# Patient Record
(demographics unavailable — no encounter records)

---

## 2024-10-17 NOTE — PHYSICAL EXAM
[Alert] : alert [Well Nourished] : well nourished [Healthy Appearance] : healthy appearance [No Acute Distress] : no acute distress [Well Developed] : well developed [Normal Pupil & Iris Size/Symmetry] : normal pupil and iris size and symmetry [No Discharge] : no discharge [No Photophobia] : no photophobia [Sclera Not Icteric] : sclera not icteric [Normal TMs] : both tympanic membranes were normal [Normal Nasal Mucosa] : the nasal mucosa was normal [Normal Lips/Tongue] : the lips and tongue were normal [Normal Outer Ear/Nose] : the ears and nose were normal in appearance [Normal Tonsils] : normal tonsils [No Thrush] : no thrush [Pale mucosa] : pale mucosa [Boggy Nasal Turbinates] : boggy and/or pale nasal turbinates [Posterior Pharyngeal Cobblestoning] : posterior pharyngeal cobblestoning [Supple] : the neck was supple [Normal Rate and Effort] : normal respiratory rhythm and effort [No Crackles] : no crackles [No Retractions] : no retractions [Bilateral Audible Breath Sounds] : bilateral audible breath sounds [Normal Rate] : heart rate was normal  [Normal S1, S2] : normal S1 and S2 [No murmur] : no murmur [Regular Rhythm] : with a regular rhythm [Soft] : abdomen soft [Not Tender] : non-tender [Not Distended] : not distended [No HSM] : no hepato-splenomegaly [Normal Cervical Lymph Nodes] : cervical [Skin Intact] : skin intact  [No Rash] : no rash [No Skin Lesions] : no skin lesions [No clubbing] : no clubbing [No Edema] : no edema [No Cyanosis] : no cyanosis [Normal Mood] : mood was normal [Normal Affect] : affect was normal [Alert, Awake, Oriented as Age-Appropriate] : alert, awake, oriented as age appropriate [Wheezing] : no wheezing was heard [de-identified] : patches of hypopigmentation on face

## 2024-10-17 NOTE — PHYSICAL EXAM
[Alert] : alert [Well Nourished] : well nourished [Healthy Appearance] : healthy appearance [No Acute Distress] : no acute distress [Well Developed] : well developed [Normal Pupil & Iris Size/Symmetry] : normal pupil and iris size and symmetry [No Discharge] : no discharge [No Photophobia] : no photophobia [Sclera Not Icteric] : sclera not icteric [Normal TMs] : both tympanic membranes were normal [Normal Nasal Mucosa] : the nasal mucosa was normal [Normal Lips/Tongue] : the lips and tongue were normal [Normal Outer Ear/Nose] : the ears and nose were normal in appearance [Normal Tonsils] : normal tonsils [No Thrush] : no thrush [Pale mucosa] : pale mucosa [Boggy Nasal Turbinates] : boggy and/or pale nasal turbinates [Posterior Pharyngeal Cobblestoning] : posterior pharyngeal cobblestoning [Supple] : the neck was supple [Normal Rate and Effort] : normal respiratory rhythm and effort [No Crackles] : no crackles [No Retractions] : no retractions [Bilateral Audible Breath Sounds] : bilateral audible breath sounds [Normal Rate] : heart rate was normal  [Normal S1, S2] : normal S1 and S2 [No murmur] : no murmur [Regular Rhythm] : with a regular rhythm [Soft] : abdomen soft [Not Tender] : non-tender [Not Distended] : not distended [No HSM] : no hepato-splenomegaly [Normal Cervical Lymph Nodes] : cervical [Skin Intact] : skin intact  [No Rash] : no rash [No Skin Lesions] : no skin lesions [No clubbing] : no clubbing [No Edema] : no edema [No Cyanosis] : no cyanosis [Normal Mood] : mood was normal [Normal Affect] : affect was normal [Alert, Awake, Oriented as Age-Appropriate] : alert, awake, oriented as age appropriate [Wheezing] : no wheezing was heard [de-identified] : patches of hypopigmentation on face

## 2024-10-17 NOTE — IMPRESSION
[Fractional of Exhaled Nitric Oxide ___] : Fractional of Exhaled Nitric Oxide [unfilled] [Normal] : normal [Spirometry] : Spirometry [Normal Spirometry] : spirometry normal

## 2024-10-17 NOTE — SOCIAL HISTORY
[House] : [unfilled] lives in a house  [None] : none [Smokers in Household] : there are no smokers in the home [de-identified] : wood floor

## 2024-10-17 NOTE — SOCIAL HISTORY
[House] : [unfilled] lives in a house  [None] : none [Smokers in Household] : there are no smokers in the home [de-identified] : wood floor

## 2024-12-03 NOTE — HISTORY OF PRESENT ILLNESS
[de-identified] : Kath is a 7 year old girl with new onset cough who presents for follow-up.  Takes fluticasone BID.  Uses a spacer. Never missed a dose.  No SOB. Walks outside in the cold. No albuterol use in the last month. No activity limitation.  Sleeping in her own bed.  Taking zyrtec daily. Got a flu shot.  Oct 2024: Pt has been taking Fluticasone 44mcg/puff, 2 puffs BID. No missed doses. No albuterol use.  Parents have been more diligent about cleaning and has not been running the A/C anymore.  Also taking daily zyrtec. Reintroduced corn and broccoli as well as eggs and milk/. Ate nutella without an issue. Still avoiding apple - all forms of apple give eye swelling.   Sept 2024: Pt presents today for further evaluation. Last night she started coughing and when mother auscultated (mother is an NP), she heard wheezing and gave albuterol. Wheezing resolved with albuterol. Mother showed immunocap testing (results shown below).  Sept 4th: 5 weeks prior she had hubba isabell bubble gum and lips were swollen.  Months ago she had a ring pop and had hives on her back.  3 weeks ago she had dinner and then went upstairs to go to bed. the middle of her upper lip began to swell. Mom gave benadryl and 15 minutes later she started coughing and her voice sounded raspy. Mom is an NP and auscultated and heard wheezing. Drove to  where she was given decadron, an albuterol neb, a dose of epipen IM. After the epi she felt considerably. For dinner she had corn, egg, broccoli, Malaysian bread (nan - wheat based). Then had a watermelon icy. Watermelon icy was fairly new.  8/28 - pt was in the basement watching move and came upstairs and said she could not breathe. Laying on the sofa -not an old couch.  Finished basement but ceiling is wood.  8/30 at chocolate with cornflakes - had coughing and wheezing 30 minutes.  Gave albuterol neb which helped. Otherwise eats chocolate with no issues.  Mom gave her a neb and she improved.   9/2 -Slept with mother. Dad did not dust off bedframe.  Was also under the A/C vent. Started coughing and wheezing, mom gave her a nebulizer which did not help.  Stil heard wheezing.  Called 911, went to the ED and pt was fine.   Went to an allergist and was positive to dust mite 20, 30, cats, birch 25.2 tree, apple 0.85, egg 0.61.  Broccoli, corn, tomato, watermelon  - zero. Normal complements (C3 171, C4 19). When she eats apples her eye gets swollen  June/July - one eye swelling with   Amoxicillin - had lip swelling and hives everywhere.  No nocturnal cough. No cough with activity.  Eczema from 1-3 years of age. Also used to get random hives when she was 1-3 years of age. Strep March 2024.  No recent infections.   Food allergy: No suspicion for food allergy.  Tolerates milk, eggs, wheat, soy, peanut, tree nut, fish and shellfish, sesame.   Has an epipen.  Stayed home from camp this summer.  Fluid in her ears, failed hearing twst twice - ENT appt - started zyrtec daily 3 weeks ago.  Once last fall ran to school and was SOB.

## 2024-12-03 NOTE — IMPRESSION
[Spirometry] : Spirometry [Normal Spirometry] : spirometry normal [Fractional of Exhaled Nitric Oxide ___] : Fractional of Exhaled Nitric Oxide [unfilled] [Normal] : normal

## 2024-12-03 NOTE — SOCIAL HISTORY
[House] : [unfilled] lives in a house  [None] : none [Smokers in Household] : there are no smokers in the home [de-identified] : wood floor

## 2025-03-07 NOTE — REASON FOR VISIT
[Behavioral Health Urgent Care Assessment] : a behavioral health urgent care assessment [Patient] : patient [Parents] : parents [Self] : alone [TextBox_17] : connection to care

## 2025-03-07 NOTE — PLAN
[Contact was Attempted] : contact was attempted [TextBox_9] : linkage to therapy  [TextBox_11] : none [TextBox_26] : self-referred - school HIPAA not signed

## 2025-03-07 NOTE — PHYSICAL EXAM
[Normal] : normal [None] : none [Cooperative] : cooperative [Euthymic] : euthymic [Full] : full [Clear] : clear [Soft] : soft [Linear/Goal Directed] : linear/goal directed [Average] : average [WNL] : within normal limits

## 2025-03-07 NOTE — HISTORY OF PRESENT ILLNESS
[FreeTextEntry1] : Patient is an 8-year-old female in second grade at [employer], domiciled with parents and sister with no PPH, no past inpatient admissions, no past SA/SIB, no substance use, no history of abuse and FH of depression, anxiety and schizophrenia brought in by parents for connection to care due to emotion regulation issues and not wanting to share her feelings.  Patient presented calm and cooperative with appropriate affect, shy throughout the interview.  Reports she is not sure why she is here.  On symptoms review does report getting angry or upset at times with medium frequency which results and yelling and sometimes hitting her sister.  Denied mood/psychotic/anxiety/ADHD symptoms. Denied current or past SI/HI, plan or intent. Denied current urges to harm self or others. Denied current aggressive ideations.  Future oriented and wants to become an artist or an actor or make cartoons.  For her 3 wishes she wanted to have a puppy, to not be allergic to so many things and to have a pet mark.  Collateral from parents confirms above history.  Report patient cannot regulate her emotions at times and takes a while to calm down after becoming upset.  Parents are in the process of  which has caused patient to cry at times but has said she does not want to talk about her feelings which is concerning to them.  No history of SI/SIB or major aggression and no acute safety concerns.  In agreement with linkage to therapy. [FreeTextEntry2] : none [FreeTextEntry3] : none

## 2025-07-09 NOTE — SOCIAL HISTORY
[House] : [unfilled] lives in a house  [None] : none [Smokers in Household] : there are no smokers in the home [de-identified] : wood floor

## 2025-07-09 NOTE — HISTORY OF PRESENT ILLNESS
[de-identified] : Kath is an 8 year old girl with mild persistent asthma who presents for follow-up.  She has been taking Flovent 44mcg/puff, 2 puffs BID. She continued this throughout all of winter and spring. No nocturnal cough, no cough with activity. Sometimes feels out of breath when she runs -mom notices this but it does not stop her from activity.  No albuterol use in the past few months.  Planning to travel to [place] in August - will be there for a month.  Gets a swollen eye each time she has apple exposure - apple gummy, apple juice and fresh apple.  Also gets hives with red dye.  Food allergy: No suspicion for food allergy.  Tolerates milk, eggs, wheat, soy, peanut, tree nut, fish and shellfish, sesame.  Dec 2024: Takes fluticasone BID.  Uses a spacer. Never missed a dose.  No SOB. Walks outside in the cold. No albuterol use in the last month. No activity limitation.  Sleeping in her own bed.  Taking zyrtec daily. Got a flu shot.  Oct 2024: Pt has been taking Fluticasone 44mcg/puff, 2 puffs BID. No missed doses. No albuterol use.  Parents have been more diligent about cleaning and has not been running the A/C anymore.  Also taking daily zyrtec. Reintroduced corn and broccoli as well as eggs and milk/. Ate nutella without an issue. Still avoiding apple - all forms of apple give eye swelling.   Sept 2024: Pt presents today for further evaluation. Last night she started coughing and when mother auscultated (mother is an NP), she heard wheezing and gave albuterol. Wheezing resolved with albuterol. Mother showed immunocap testing (results shown below).  Sept 4th: 5 weeks prior she had hubba isabell bubble gum and lips were swollen.  Months ago she had a ring pop and had hives on her back.  3 weeks ago she had dinner and then went upstairs to go to bed. the middle of her upper lip began to swell. Mom gave benadryl and 15 minutes later she started coughing and her voice sounded raspy. Mom is an NP and auscultated and heard wheezing. Drove to  where she was given decadron, an albuterol neb, a dose of epipen IM. After the epi she felt considerably. For dinner she had corn, egg, broccoli,  bread (nan - wheat based). Then had a watermelon icy. Watermelon icy was fairly new.  8/28 - pt was in the basement watching move and came upstairs and said she could not breathe. Laying on the sofa -not an old couch.  Finished basement but ceiling is wood.  8/30 at chocolate with cornflakes - had coughing and wheezing 30 minutes.  Gave albuterol neb which helped. Otherwise eats chocolate with no issues.  Mom gave her a neb and she improved.   9/2 -Slept with mother. Dad did not dust off bedframe.  Was also under the A/C vent. Started coughing and wheezing, mom gave her a nebulizer which did not help.  Stil heard wheezing.  Called 911, went to the ED and pt was fine.   Went to an allergist and was positive to dust mite 20, 30, cats, birch 25.2 tree, apple 0.85, egg 0.61.  Broccoli, corn, tomato, watermelon  - zero. Normal complements (C3 171, C4 19). When she eats apples her eye gets swollen  June/July - one eye swelling with   Amoxicillin - had lip swelling and hives everywhere.  No nocturnal cough. No cough with activity.  Eczema from 1-3 years of age. Also used to get random hives when she was 1-3 years of age. Strep March 2024. Had a few colds but these did not progress.  In the spring she went to [place] on a windy day and she developed hives.  Hands, feet and lips swelled. Got a dose of decadron and improved.  Food allergy: No suspicion for food allergy.  Tolerates milk, eggs, wheat, soy, peanut, tree nut, fish and shellfish, sesame.   Has an epipen due to prior episodes that were attributed to anaphylaxis prior to her dx of asthma.   Stayed home from [place] this summer.  Fluid in her ears, failed hearing twst twice - ENT appt - started zyrtec daily 3 weeks ago.  Once last fall ran to school and was SOB.